# Patient Record
Sex: FEMALE | Race: WHITE | URBAN - METROPOLITAN AREA
[De-identification: names, ages, dates, MRNs, and addresses within clinical notes are randomized per-mention and may not be internally consistent; named-entity substitution may affect disease eponyms.]

---

## 2017-11-28 ENCOUNTER — EMERGENCY (EMERGENCY)
Facility: HOSPITAL | Age: 27
LOS: 1 days | Discharge: HOME | End: 2017-11-28
Attending: EMERGENCY MEDICINE
Payer: SELF-PAY

## 2017-11-28 VITALS
HEIGHT: 63 IN | SYSTOLIC BLOOD PRESSURE: 110 MMHG | WEIGHT: 154.98 LBS | RESPIRATION RATE: 18 BRPM | HEART RATE: 79 BPM | DIASTOLIC BLOOD PRESSURE: 75 MMHG | TEMPERATURE: 99 F | OXYGEN SATURATION: 98 %

## 2017-11-28 VITALS
DIASTOLIC BLOOD PRESSURE: 75 MMHG | HEART RATE: 70 BPM | RESPIRATION RATE: 18 BRPM | OXYGEN SATURATION: 98 % | SYSTOLIC BLOOD PRESSURE: 118 MMHG | TEMPERATURE: 100 F

## 2017-11-28 PROCEDURE — 73564 X-RAY EXAM KNEE 4 OR MORE: CPT | Mod: 26,LT

## 2017-11-28 PROCEDURE — 99284 EMERGENCY DEPT VISIT MOD MDM: CPT

## 2017-11-28 PROCEDURE — 99284 EMERGENCY DEPT VISIT MOD MDM: CPT | Mod: 25

## 2017-11-28 PROCEDURE — 73564 X-RAY EXAM KNEE 4 OR MORE: CPT

## 2017-11-28 RX ORDER — IBUPROFEN 200 MG
600 TABLET ORAL ONCE
Qty: 0 | Refills: 0 | Status: COMPLETED | OUTPATIENT
Start: 2017-11-28 | End: 2017-11-28

## 2017-11-28 RX ADMIN — Medication 600 MILLIGRAM(S): at 19:43

## 2017-11-28 RX ADMIN — Medication 600 MILLIGRAM(S): at 19:09

## 2017-11-28 NOTE — ED PROVIDER NOTE - ATTENDING CONTRIBUTION TO CARE
28yo female presents with c/o left knee pain s/p twisting her knee today. PE shows (-) anterior drawer and (-) posterior drawer test. Neurovascularly intact. Knee XR (-) for fx/dislocation. Dr. Lemos saw pt in ED and gave pt knee immobilizer and will f/u pt as an outpt.

## 2017-11-28 NOTE — ED PROVIDER NOTE - OBJECTIVE STATEMENT
26 y/o female, no significant pmhx c/o L knee pain with laxity s/p twisting today. Denies falls, previous injuries, paresthesia, fever/chills or any other concerns.

## 2017-11-28 NOTE — ED PROVIDER NOTE - MEDICAL DECISION MAKING DETAILS
pt well appearing, L knee pain, xray negative, consulted [facility], he placed in knee immobilizer gave crutches with out patient follow up for MRI, IBU OTC.

## 2017-12-03 NOTE — CONSULT NOTE ADULT - SUBJECTIVE AND OBJECTIVE BOX
HPI: Patient is a 28 y/o female, [employer] 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 Patient was injured at her job today when she suffered a twisting injury to her left knee.      PAST MEDICAL & SURGICAL HISTORY:  No pertinent past medical history  No significant past surgical history    Review of systems: Non Contributory    MEDICATIONS  (STANDING):    Allergies: PC Pen VK (Rash), penicillin (Hives)    Physical Examination:    Musculoskeletal:   Left knee: Positive tenderness to palpation of joint line and the patellofemoral facets. Positive patellar apprehension sign, pain on flexion of the knee, decreased range of motion.     Neurovascularly Intact    RADIOLOGY & ADDITIONAL STUDIES: X ray: Negative for fracture    ASSESSMENT: Left knee sprain, s/p work related injury, ? patellar subluxation    PLAN/RECOMMENDATION: Long leg immobilizer splint was applied.     MRI of left knee, r/o meniscus tear    FOLLOW UP: With office, after MRI